# Patient Record
Sex: MALE | Race: WHITE | HISPANIC OR LATINO | Employment: OTHER | URBAN - METROPOLITAN AREA
[De-identification: names, ages, dates, MRNs, and addresses within clinical notes are randomized per-mention and may not be internally consistent; named-entity substitution may affect disease eponyms.]

---

## 2023-12-06 ENCOUNTER — OFFICE VISIT (OUTPATIENT)
Dept: DERMATOLOGY | Facility: CLINIC | Age: 88
End: 2023-12-06
Payer: MEDICARE

## 2023-12-06 DIAGNOSIS — L73.9 FOLLICULITIS: ICD-10-CM

## 2023-12-06 DIAGNOSIS — L21.9 SEBORRHEIC DERMATITIS: ICD-10-CM

## 2023-12-06 DIAGNOSIS — L71.0 PERIORAL DERMATITIS: Primary | ICD-10-CM

## 2023-12-06 PROCEDURE — 1160F RVW MEDS BY RX/DR IN RCRD: CPT | Performed by: DERMATOLOGY

## 2023-12-06 PROCEDURE — 1159F MED LIST DOCD IN RCRD: CPT | Performed by: DERMATOLOGY

## 2023-12-06 PROCEDURE — 1036F TOBACCO NON-USER: CPT | Performed by: DERMATOLOGY

## 2023-12-06 PROCEDURE — 99204 OFFICE O/P NEW MOD 45 MIN: CPT | Performed by: DERMATOLOGY

## 2023-12-06 RX ORDER — LORATADINE 10 MG/1
TABLET ORAL
COMMUNITY

## 2023-12-06 RX ORDER — FLUTICASONE PROPIONATE 50 MCG
SPRAY, SUSPENSION (ML) NASAL
COMMUNITY

## 2023-12-06 RX ORDER — BISOPROLOL FUMARATE 10 MG/1
TABLET, FILM COATED ORAL EVERY 24 HOURS
COMMUNITY

## 2023-12-06 RX ORDER — PANTOPRAZOLE SODIUM 40 MG/1
TABLET, DELAYED RELEASE ORAL
COMMUNITY
Start: 2023-11-30

## 2023-12-06 RX ORDER — DOXYCYCLINE 100 MG/1
TABLET ORAL
Qty: 60 TABLET | Refills: 1 | Status: SHIPPED | OUTPATIENT
Start: 2023-12-06

## 2023-12-06 RX ORDER — TAMSULOSIN HYDROCHLORIDE 0.4 MG/1
1 CAPSULE ORAL DAILY
COMMUNITY
Start: 2018-04-04

## 2023-12-06 RX ORDER — ALBUTEROL SULFATE 90 UG/1
AEROSOL, METERED RESPIRATORY (INHALATION)
COMMUNITY

## 2023-12-06 RX ORDER — DICYCLOMINE HYDROCHLORIDE 10 MG/1
CAPSULE ORAL
COMMUNITY
Start: 2023-09-13

## 2023-12-06 RX ORDER — ESCITALOPRAM OXALATE 20 MG/1
TABLET ORAL
COMMUNITY
Start: 2023-12-03

## 2023-12-06 NOTE — PROGRESS NOTES
Subjective     Raymond Schultz is a 88 y.o. male who presents for the following: Rash (On chin - tried ketoconazole cream and metronidazole cream and not working ) and Suspicious Skin Lesion (On shoulder, back and ear - itchy spots ).       Review of Systems:  No other skin or systemic complaints other than what is documented elsewhere in the note.    The following portions of the chart were reviewed this encounter and updated as appropriate:   Tobacco  Allergies  Meds  Problems  Med Hx  Surg Hx         Skin Cancer History  No skin cancer on file. 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      Specialty Problems    None       Objective   Well appearing patient in no apparent distress; mood and affect are within normal limits.    A focused skin examination was performed. All findings within normal limits unless otherwise noted below.    Assessment/Plan   1. Perioral dermatitis  Head - Anterior (Face)  Few erythematous scaly and eroded papules in perioral distribution sparing the vermilion lip    -We reviewed the etiology of periorificial dermatitis in detail. Periorificial dermatitis is a common acneiform condition which presents with erythematous papules, pustules surrounding the perioral, perinasal, and periocular area.  Occasionally flesh colored papules or nodules may be noted.  When papules resolve there can be residual erythema and scaling.  The etiology is unknown, but is occasionally thought to be related to high potency topical steroid use in these areas.  It is generally a self limited condition.  Resolution can take months to years, and the lesions can resolve with scarring.  Treatment options including topical antibiotics, oral antibiotics in severe cases.   -Instructed patient to discontinue topical ketoconazole and metronidazole in this area. He is not currently using topical steroids.  PLAN:  -Start doxycycline 100mg BID for 6 weeks. Risks, benefits, side  effects reviewed including upset stomach, esophagus irritation, sun sensitivity. Take on full stomach with full glass of water.  -Follow up as needed    2. Seborrheic dermatitis  Right Ear  Erythema and scale of right ear medial helix     -We reviewed the etiology of seborrheic dermatitis with the patient. We discussed that pityrosporum plays a role and creates local inflammation which can manifest as scaling, erythema, and pruritus.  -Treatment options discussed.   -Begin use of ketoconazole 2% cream to face involved areas of face twice daily.  Patient already has prescription from outside provider.    3. Folliculitis  Right Supraclavicular Area  Few erythematous follicularly based pustules on right supraclavicular space    -We reviewed the etiology of folliculitis.  Folliculitis results from inflammation of the hair follicles.  The clinical appearance varies according to the location and depth of the follicular involvement.  Superficial folliculitis begins with infection of the follicular ostium and presents with small yellow white pustules on an erythematous base with a protruding hair shaft from the center.  Staph aureus is the most common pathogen.    -Superficial folliculitis usually responds to gentle cleansing with antibacterial soaps and application of topical antibiotics.  More extensive cases may require oral antibiotics.  PLAN  -Due to limited involvement and lack of symptoms, patient was reassured that this is not consistent with non melanoma skin cancer and that no treatment is necessary.      Follow up as needed

## 2023-12-20 ENCOUNTER — APPOINTMENT (OUTPATIENT)
Dept: DERMATOLOGY | Facility: CLINIC | Age: 88
End: 2023-12-20
Payer: MEDICARE